# Patient Record
Sex: FEMALE
[De-identification: names, ages, dates, MRNs, and addresses within clinical notes are randomized per-mention and may not be internally consistent; named-entity substitution may affect disease eponyms.]

---

## 2021-01-19 ENCOUNTER — DASHBOARD ENCOUNTERS (OUTPATIENT)
Age: 36
End: 2021-01-19

## 2021-01-19 ENCOUNTER — WEB ENCOUNTER (OUTPATIENT)
Dept: URBAN - METROPOLITAN AREA CLINIC 115 | Facility: CLINIC | Age: 36
End: 2021-01-19

## 2021-01-19 ENCOUNTER — OFFICE VISIT (OUTPATIENT)
Dept: URBAN - METROPOLITAN AREA CLINIC 115 | Facility: CLINIC | Age: 36
End: 2021-01-19
Payer: COMMERCIAL

## 2021-01-19 VITALS
WEIGHT: 138.6 LBS | SYSTOLIC BLOOD PRESSURE: 102 MMHG | DIASTOLIC BLOOD PRESSURE: 67 MMHG | RESPIRATION RATE: 18 BRPM | TEMPERATURE: 97.5 F | BODY MASS INDEX: 26.17 KG/M2 | HEIGHT: 61 IN | HEART RATE: 82 BPM

## 2021-01-19 DIAGNOSIS — R94.5 ABNORMAL LFTS (LIVER FUNCTION TESTS): ICD-10-CM

## 2021-01-19 DIAGNOSIS — O99.283 ENDOCRINE, NUTRITIONAL AND METABOLIC DISEASES COMPLICATING PREGNANCY, THIRD TRIMESTER: ICD-10-CM

## 2021-01-19 DIAGNOSIS — R07.89 ATYPICAL CHEST PAIN: ICD-10-CM

## 2021-01-19 PROBLEM — 1073811000119102: Status: ACTIVE | Noted: 2021-01-19

## 2021-01-19 PROBLEM — 72271000119100: Status: ACTIVE | Noted: 2021-01-19

## 2021-01-19 PROBLEM — 102589003: Status: ACTIVE | Noted: 2021-01-19

## 2021-01-19 PROBLEM — 166603001: Status: ACTIVE | Noted: 2021-01-19

## 2021-01-19 PROBLEM — 90739004: Status: ACTIVE | Noted: 2021-01-19

## 2021-01-19 PROCEDURE — G8427 DOCREV CUR MEDS BY ELIG CLIN: HCPCS | Performed by: INTERNAL MEDICINE

## 2021-01-19 PROCEDURE — G8420 CALC BMI NORM PARAMETERS: HCPCS | Performed by: INTERNAL MEDICINE

## 2021-01-19 PROCEDURE — G8482 FLU IMMUNIZE ORDER/ADMIN: HCPCS | Performed by: INTERNAL MEDICINE

## 2021-01-19 PROCEDURE — 99204 OFFICE O/P NEW MOD 45 MIN: CPT | Performed by: INTERNAL MEDICINE

## 2021-01-19 PROCEDURE — 4004F PT TOBACCO SCREEN RCVD TLK: CPT | Performed by: INTERNAL MEDICINE

## 2021-01-19 NOTE — PHYSICAL EXAM GASTROINTESTINAL
normal bowel sounds , no masses palpable , no guarding or rigidity , soft, nontender, nondistended gravid uterus with no abnormalities

## 2021-01-20 LAB
ALBUMIN: 3.8
ALKALINE PHOSPHATASE: 85
ALT (SGPT): 32
AST (SGOT): 24
BILIRUBIN, DIRECT: 0.05
BILIRUBIN, TOTAL: <0.2
GGT: 11
PROTEIN, TOTAL: 7